# Patient Record
(demographics unavailable — no encounter records)

---

## 2020-10-26 NOTE — EMERGENCY DEPARTMENT REPORT
ED General Adult HPI





- General


Chief complaint: Psych


Stated complaint: MH EVALUATION


PUI?: No


Time Seen by Provider: 10/26/20 09:04


Source: patient, RN notes reviewed


Mode of arrival: Ambulatory


Limitations: No Limitations





- History of Present Illness


Initial comments: 





The patient was evaluated in the emergency department for symptoms described in 

the history of present illness.  He/she was evaluated in the context of the alejandra

bal COVID-19 pandemic, which necessitated consideration that the patient might 

be at risk for infection with the virus that causes COVID-19.  Institutional 

protocols and algorithms that pertain to the evaluation of patients at risk for 

COVID-19 are in a state of rapid change based on information released by 

regulatory bodies including the CDC and federal and state organizations.  These 

policies and algorithms were followed during the patient's care in the emergency

department.  Please note that these policies, procedures and recommendations 

changed on a rapid basis.








The patient is a 27-year-old gentleman.  He is not known to myself previously.  

He has a history of paranoid schizophrenia.  He presents to the ER today with 

complaint of painless auditory hallucinations, and wanting to kill himself.  He 

denies physical pain.  He denies access to guns, firearms.  He denies loss of 

taste, loss of smell, cough, urinary symptoms, exposure to Covid.  He denies 

intentional overdose.  Symptoms present for the past day or so.  They are 

constant, painless, do not radiate anywhere, and he endorses no exacerbating or 

relieving factors.





He reports that he drove himself here in his own vehicle.





He denies any stressors that he can recall.


-: Gradual, days(s)


Consistency: constant


Improves with: none


Worsens with: none


Associated Symptoms: denies other symptoms





- Related Data


                                Home Medications











 Medication  Instructions  Recorded  Confirmed  Last Taken


 


ALPRAZolam [Xanax TAB] 0.25 mg 10/26/20  Unknown


 


risperiDONE [RisperDAL] 1 mg PO 10/26/20  Unknown











                                    Allergies











Allergy/AdvReac Type Severity Reaction Status Date / Time


 


No Known Allergies Allergy   Unverified 08/17/20 14:46














ED Review of Systems


ROS: 


Stated complaint: MH EVALUATION


Other details as noted in HPI





Comment: All other systems reviewed and negative


Psychiatric: auditory hallucinations, suicidal thoughts.  denies: visual 

hallucinations, homicidal thoughts





ED Past Medical Hx





- Past Medical History


Previous Medical History?: Yes


Hx Psychiatric Treatment: Yes (paranoid schizophrenia)





- Surgical History


Past Surgical History?: No





- Social History


Smoking Status: Never Smoker


Substance Use Type: None





- Medications


Home Medications: 


                                Home Medications











 Medication  Instructions  Recorded  Confirmed  Last Taken  Type


 


ALPRAZolam [Xanax TAB] 0.25 mg 10/26/20  Unknown History


 


risperiDONE [RisperDAL] 1 mg PO 10/26/20  Unknown History














ED Physical Exam





- General


Limitations: No Limitations


General appearance: alert, in no apparent distress





- Head


Head exam: Present: atraumatic, normocephalic





- Eye


Eye exam: Present: normal appearance, EOMI.  Absent: nystagmus





- ENT


ENT exam: Present: normal exam, normal orophraynx, mucous membranes moist, 

normal external ear exam





- Neck


Neck exam: Present: normal inspection, full ROM.  Absent: tenderness, meningismu

s





- Respiratory


Respiratory exam: Present: normal lung sounds bilaterally.  Absent: respiratory 

distress, wheezes, rales, rhonchi, stridor, decreased breath sounds





- Cardiovascular


Cardiovascular Exam: Present: regular rate, normal rhythm, normal heart sounds. 

 Absent: bradycardia, tachycardia, irregular rhythm, systolic murmur, diastolic 

murmur, rubs, gallop





- GI/Abdominal


GI/Abdominal exam: Present: soft, normal bowel sounds.  Absent: distended, 

tenderness, guarding, rebound, rigid, pulsatile mass





- Rectal


Rectal exam: Present: deferred





- Extremities Exam


Extremities exam: Present: normal inspection, full ROM, other (2+ pulses noted 

in the bilateral upper and lower extremities.  There is no palpable cord.   

negative Homans sign.  Muscular compartments are soft.  The pelvis is stable.). 

 Absent: pedal edema, calf tenderness





- Back Exam


Back exam: Present: normal inspection, full ROM, CVA tenderness (R).  Absent: 

tenderness, CVA tenderness (L), paraspinal tenderness, vertebral tenderness





- Neurological Exam


Neurological exam: Present: alert, normal gait, other (No facial droop.  Tongue 

midline.  Extraocular movements intact bilaterally.  Facial sensation intact to 

light touch in V1, V2, V3 distribution bilaterally.  5 and a 5 strength in 4 

extremities.  Sensation intact to light touch in 4 extremities.).  Absent: motor

 sensory deficit





- Psychiatric


Psychiatric exam: Present: flat affect, suicidal ideation.  Absent: homicidal 

ideation





- Skin


Skin exam: Present: warm, dry, intact, normal color.  Absent: rash





ED Course


                                   Vital Signs











  10/26/20 10/26/20





  07:30 09:37


 


Temperature 97.7 F 


 


Pulse Rate 64 


 


Respiratory 24 18





Rate  


 


Blood Pressure 142/89 


 


O2 Sat by Pulse 100 99





Oximetry  














ED Medical Decision Making





- Lab Data


Result diagrams: 


                                 10/26/20 07:46





                                 10/26/20 07:46








                                   Vital Signs











  10/26/20 10/26/20





  07:30 09:37


 


Temperature 97.7 F 


 


Pulse Rate 64 


 


Respiratory 24 18





Rate  


 


Blood Pressure 142/89 


 


O2 Sat by Pulse 100 99





Oximetry  











Labs











  10/26/20 10/26/20 10/26/20





  07:46 07:46 07:46


 


WBC   


 


RBC   


 


Hgb   


 


Hct   


 


MCV   


 


MCH   


 


MCHC   


 


RDW   


 


Plt Count   


 


Lymph % (Auto)   


 


Mono % (Auto)   


 


Eos % (Auto)   


 


Baso % (Auto)   


 


Lymph # (Auto)   


 


Mono # (Auto)   


 


Eos # (Auto)   


 


Baso # (Auto)   


 


Seg Neutrophils %   


 


Seg Neutrophils #   


 


Sodium    141


 


Potassium    4.2


 


Chloride    102.3


 


Carbon Dioxide    29


 


Anion Gap    14


 


BUN    5 L


 


Creatinine    1.0


 


Estimated GFR    > 60


 


BUN/Creatinine Ratio    5


 


Glucose    89


 


Calcium    9.3


 


Salicylates  < 0.3 L  


 


Acetaminophen   5.0 L 


 


Plasma/Serum Alcohol   














  10/26/20 10/26/20





  07:46 07:46


 


WBC   5.1


 


RBC   4.99


 


Hgb   15.8 H


 


Hct   45.5


 


MCV   91


 


MCH   32


 


MCHC   35 H


 


RDW   14.1


 


Plt Count   162


 


Lymph % (Auto)   40.2 H


 


Mono % (Auto)   13.8 H


 


Eos % (Auto)   0.9


 


Baso % (Auto)   0.7


 


Lymph # (Auto)   2.1


 


Mono # (Auto)   0.7


 


Eos # (Auto)   0.0


 


Baso # (Auto)   0.0


 


Seg Neutrophils %   44.4


 


Seg Neutrophils #   2.3


 


Sodium  


 


Potassium  


 


Chloride  


 


Carbon Dioxide  


 


Anion Gap  


 


BUN  


 


Creatinine  


 


Estimated GFR  


 


BUN/Creatinine Ratio  


 


Glucose  


 


Calcium  


 


Salicylates  


 


Acetaminophen  


 


Plasma/Serum Alcohol  < 0.01 














- Medical Decision Making





Differential diagnosis, including but not limited to: Suicidal ideation, 

auditory hallucinations, paranoid schizophrenia, medical clearance for 

psychiatric placement





Assessment and plan: 27-year-old gentleman, who was afebrile, with reassuring 

vital signs, clinically sober, with a GCS of 15, unremarkable physical 

examination, unremarkable neurologic examination, with a complaint of auditory 

hallucinations and suicidality.





Physical exam benign, screening laboratory studies negative for emergent 

toxicologic/metabolic insult.  Denies cough, fever, urinary symptoms.  

Urinalysis pending at this time, as part of our typical psychiatry protocol, 

however, clinically, I do not suspect a urinary tract infection.





A psychiatric consultation is requested.  I have also requested that nursing 

team reconcile patient's medications.





At this point in time, patient does not appear to have an immediate medical 

contraindication to psychiatric admission, evaluation, consultation, and dis

position as per their final recommendations.








Critical care attestation.: 


If time is entered above; I have spent that time in minutes in the direct care 

of this critically ill patient, excluding procedure time.








ED Disposition


Clinical Impression: 


 Medical clearance for psychiatric admission





Disposition: [id number] PSY HOSP/PSY UNIT


Is pt being admited?: No


Does the pt Need Aspirin: No


Condition: Good


Referrals: 


PRIMARY CARE,MD [Primary Care Provider] - 3-5 Days